# Patient Record
Sex: MALE
[De-identification: names, ages, dates, MRNs, and addresses within clinical notes are randomized per-mention and may not be internally consistent; named-entity substitution may affect disease eponyms.]

---

## 2019-04-01 PROBLEM — Z00.00 ENCOUNTER FOR PREVENTIVE HEALTH EXAMINATION: Status: ACTIVE | Noted: 2019-04-01

## 2019-04-02 ENCOUNTER — APPOINTMENT (OUTPATIENT)
Dept: UROLOGY | Facility: CLINIC | Age: 54
End: 2019-04-02
Payer: COMMERCIAL

## 2019-04-02 VITALS
DIASTOLIC BLOOD PRESSURE: 89 MMHG | BODY MASS INDEX: 30.64 KG/M2 | SYSTOLIC BLOOD PRESSURE: 125 MMHG | HEIGHT: 70 IN | WEIGHT: 214 LBS | HEART RATE: 98 BPM

## 2019-04-02 DIAGNOSIS — Z87.898 PERSONAL HISTORY OF OTHER SPECIFIED CONDITIONS: ICD-10-CM

## 2019-04-02 DIAGNOSIS — Z86.79 PERSONAL HISTORY OF OTHER DISEASES OF THE CIRCULATORY SYSTEM: ICD-10-CM

## 2019-04-02 DIAGNOSIS — Z87.448 PERSONAL HISTORY OF OTHER DISEASES OF URINARY SYSTEM: ICD-10-CM

## 2019-04-02 DIAGNOSIS — Z86.73 PERSONAL HISTORY OF TRANSIENT ISCHEMIC ATTACK (TIA), AND CEREBRAL INFARCTION W/OUT RESIDUAL DEFICITS: ICD-10-CM

## 2019-04-02 DIAGNOSIS — F17.200 NICOTINE DEPENDENCE, UNSPECIFIED, UNCOMPLICATED: ICD-10-CM

## 2019-04-02 PROCEDURE — 99204 OFFICE O/P NEW MOD 45 MIN: CPT | Mod: 25

## 2019-04-02 PROCEDURE — 99406 BEHAV CHNG SMOKING 3-10 MIN: CPT

## 2019-04-02 NOTE — REVIEW OF SYSTEMS
[see HPI] : see HPI [Told you have blood in urine on a urine test] : told blood was present in a urine test [Wake up at night to urinate  How many times?  ___] : wakes up to urinate [unfilled] times during the night [Negative] : Heme/Lymph [FreeTextEntry3] : dull lower back pain 1

## 2019-04-02 NOTE — HISTORY OF PRESENT ILLNESS
[Hematuria - Microscopic] : microscopic hematuria [FreeTextEntry1] : Very pleasant 54 year -year old gentleman who presents for evaluation of microscopic hematuria found on urinalysis approximately 1 week ago. Patient reports no history of gross hematuria.  No flank pain. No suprapubic pain. No dysuria.  No urinary frequency, urgency.  Nocturia x 0. No history of urinary tract infections or renal impairment. No aggravating or alleviating factors that he knows of contributing to hematuria.\par \par No family history of  malignancy, including prostate cancer.  No family history of nephrolithiasis. Smokes 1 ppd x 30 years.\par \par Recent renal US demonstrated multiple benign cysts, as well as a number of complex cysts and possible kidney stones.\par  [Urinary Retention] : no urinary retention [Urinary Urgency] : no urinary urgency [Urinary Frequency] : no urinary frequency [Nocturia] : no nocturia [Straining] : no straining [Dysuria] : no dysuria [Hematuria - Gross] : no gross hematuria [Abdominal Pain] : no abdominal pain [Flank Pain] : no flank pain [Fever] : no fever [Fatigue] : no fatigue [Nausea] : no nausea [Anorexia] : no anorexia

## 2019-04-02 NOTE — ASSESSMENT
[FreeTextEntry1] : Very pleasant 54-year-old gentleman with microscopic hematuria\par -urinalysis reviewed- 10 RBC/hpf\par -renal US report reviewed\par -Labs from [facility] reviewed\par -CT Urogram\par -cystoscopy\par -Extensive discussion of the potential etiologies of microscopic hematuria, as well as the need to complete full work up.  Patient understands and wishes to proceed.\par -Patient was counseled on smoking cessation for 5 minutes.  We discussed various health benefits of quitting.  We discussed the potential Urologic implications of smoking, including an increased risk of bladder and upper tract urothelial carcinoma, kidney cancer, and a potential link to erectile dysfunction.

## 2019-05-28 ENCOUNTER — APPOINTMENT (OUTPATIENT)
Dept: UROLOGY | Facility: CLINIC | Age: 54
End: 2019-05-28
Payer: COMMERCIAL

## 2019-05-28 VITALS — SYSTOLIC BLOOD PRESSURE: 136 MMHG | HEART RATE: 85 BPM | RESPIRATION RATE: 16 BRPM | DIASTOLIC BLOOD PRESSURE: 79 MMHG

## 2019-05-28 DIAGNOSIS — R31.29 OTHER MICROSCOPIC HEMATURIA: ICD-10-CM

## 2019-05-28 DIAGNOSIS — N20.0 CALCULUS OF KIDNEY: ICD-10-CM

## 2019-05-28 DIAGNOSIS — N28.1 CYST OF KIDNEY, ACQUIRED: ICD-10-CM

## 2019-05-28 DIAGNOSIS — A63.0 ANOGENITAL (VENEREAL) WARTS: ICD-10-CM

## 2019-05-28 DIAGNOSIS — F17.210 NICOTINE DEPENDENCE, CIGARETTES, UNCOMPLICATED: ICD-10-CM

## 2019-05-28 PROCEDURE — 99214 OFFICE O/P EST MOD 30 MIN: CPT | Mod: 25

## 2019-05-28 PROCEDURE — 52000 CYSTOURETHROSCOPY: CPT

## 2019-05-28 PROCEDURE — 99406 BEHAV CHNG SMOKING 3-10 MIN: CPT

## 2019-05-28 RX ORDER — ASPIRIN 81 MG
81 TABLET, DELAYED RELEASE (ENTERIC COATED) ORAL
Refills: 0 | Status: ACTIVE | COMMUNITY

## 2019-05-28 RX ORDER — AMLODIPINE BESYLATE 5 MG/1
TABLET ORAL
Refills: 0 | Status: ACTIVE | COMMUNITY

## 2019-05-28 RX ORDER — ATORVASTATIN CALCIUM 80 MG/1
TABLET, FILM COATED ORAL
Refills: 0 | Status: ACTIVE | COMMUNITY

## 2019-05-28 RX ORDER — PODOFILOX 5 MG/G
0.5 GEL TOPICAL TWICE DAILY
Qty: 1 | Refills: 0 | Status: ACTIVE | COMMUNITY
Start: 2019-05-28 | End: 1900-01-01

## 2019-05-28 RX ORDER — VALSARTAN AND HYDROCHLOROTHIAZIDE 320; 25 MG/1; MG/1
TABLET, FILM COATED ORAL
Refills: 0 | Status: ACTIVE | COMMUNITY

## 2019-05-28 NOTE — ASSESSMENT
[FreeTextEntry1] : Very pleasant 54-year-old gentleman who presents for followup of microscopic hematuria, renal cysts, new complaint of genital warts\par -CT images reviewed\par -Condylox for genital warts\par -Follow up in one month\par -Urine studies in 6 months

## 2019-05-28 NOTE — HISTORY OF PRESENT ILLNESS
[FreeTextEntry1] : Very pleasant 54 year-old gentleman who presents for followup of microscopic hematuria, renal cysts, new complaint of genital warts. Patient feels well. No gross hematuria. No flank pain or suprapubic pain. Recent CT scan demonstrated multiple bilateral renal cysts but no concerning lesions. Cystoscopy today demonstrates no urothelial lesions but an enlarged prostate. He denies dysuria. No specific timing to his symptoms. No other complaints. [Urinary Retention] : no urinary retention [Urinary Urgency] : no urinary urgency [Urinary Frequency] : no urinary frequency [Nocturia] : no nocturia [Straining] : no straining [Dysuria] : no dysuria [Hematuria - Gross] : no gross hematuria [Hematuria - Microscopic] : microscopic hematuria [Abdominal Pain] : no abdominal pain [Flank Pain] : no flank pain [Fever] : no fever [Fatigue] : no fatigue [Nausea] : no nausea [Anorexia] : no anorexia

## 2019-07-10 ENCOUNTER — APPOINTMENT (OUTPATIENT)
Dept: UROLOGY | Facility: CLINIC | Age: 54
End: 2019-07-10

## 2020-04-15 ENCOUNTER — APPOINTMENT (OUTPATIENT)
Dept: DISASTER EMERGENCY | Facility: CLINIC | Age: 55
End: 2020-04-15
Payer: COMMERCIAL

## 2020-04-15 VITALS
TEMPERATURE: 98.7 F | SYSTOLIC BLOOD PRESSURE: 122 MMHG | DIASTOLIC BLOOD PRESSURE: 82 MMHG | OXYGEN SATURATION: 97 % | HEART RATE: 82 BPM | RESPIRATION RATE: 14 BRPM

## 2020-04-15 DIAGNOSIS — Z86.39 PERSONAL HISTORY OF OTHER ENDOCRINE, NUTRITIONAL AND METABOLIC DISEASE: ICD-10-CM

## 2020-04-15 DIAGNOSIS — R68.89 OTHER GENERAL SYMPTOMS AND SIGNS: ICD-10-CM

## 2020-04-15 DIAGNOSIS — R05 COUGH: ICD-10-CM

## 2020-04-15 PROCEDURE — 99213 OFFICE O/P EST LOW 20 MIN: CPT

## 2020-04-15 NOTE — HISTORY OF PRESENT ILLNESS
[Patient presents to the office today for COVID-19 evaluation and testing.] : Patient presents to the office today for COVID-19 evaluation and testing. 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 Patient told to seek emergent care if symptoms occur. [FreeTextEntry1] : Mr Wiley with hx of HTN, HDL  presents today with nasal congestion, dry cough & confirmed exposure. \par Denies CP, SOB, BLANC, palpitations, lightheadedness, fever, chills, nausea, vomiting, diarrhea, or recent travel. Of note, is a \par  [TextBox_107] : -Likely Dx. of COVID-19, will test given in line of duty/patient with acute symptoms, comorbidities and possible exposure.\par -Supportive care advised: Encouraged to increase PO fluids, rest, and PO Tylenol PRN  as per 's recommendations.\par -Discussed quarantine until 72 hours symptom free, including fever free without use of Tylenol.\par -Literature on COVID-19 including measures to prevent exposure to others provided and reviewed. Strict handwashing, coughing and sneezing into the elbow, maintaining social distancing, using face mask or face shield when outside and other precautionary measures strongly advised.\par -To call 911 for acute onset of SOB, persistent dyspnea on exertion and other acute distress. \par -Questions answered with expressions of understanding.\par -Pt to be notified by [facility] with results.\par -To follow with PMD \par -Work note provided upon request.\par \par \par \par

## 2020-04-16 LAB — SARS-COV-2 N GENE NPH QL NAA+PROBE: NOT DETECTED

## 2020-05-05 PROBLEM — R68.89 SUSPECTED COVID-19 VIRUS INFECTION: Status: ACTIVE | Noted: 2020-04-15

## 2020-05-14 PROBLEM — R05 COUGH: Status: ACTIVE | Noted: 2020-05-14
